# Patient Record
Sex: FEMALE | Race: WHITE | NOT HISPANIC OR LATINO
[De-identification: names, ages, dates, MRNs, and addresses within clinical notes are randomized per-mention and may not be internally consistent; named-entity substitution may affect disease eponyms.]

---

## 2021-03-01 ENCOUNTER — APPOINTMENT (OUTPATIENT)
Dept: ANTEPARTUM | Facility: CLINIC | Age: 26
End: 2021-03-01
Payer: COMMERCIAL

## 2021-03-01 ENCOUNTER — ASOB RESULT (OUTPATIENT)
Age: 26
End: 2021-03-01

## 2021-03-01 PROCEDURE — 76811 OB US DETAILED SNGL FETUS: CPT

## 2021-03-01 PROCEDURE — 99072 ADDL SUPL MATRL&STAF TM PHE: CPT

## 2021-06-18 ENCOUNTER — INPATIENT (INPATIENT)
Facility: HOSPITAL | Age: 26
LOS: 1 days | Discharge: HOME | End: 2021-06-20
Attending: STUDENT IN AN ORGANIZED HEALTH CARE EDUCATION/TRAINING PROGRAM | Admitting: STUDENT IN AN ORGANIZED HEALTH CARE EDUCATION/TRAINING PROGRAM
Payer: COMMERCIAL

## 2021-06-18 ENCOUNTER — OUTPATIENT (OUTPATIENT)
Dept: OUTPATIENT SERVICES | Facility: HOSPITAL | Age: 26
LOS: 1 days | Discharge: HOME | End: 2021-06-18

## 2021-06-18 VITALS — HEART RATE: 81 BPM | DIASTOLIC BLOOD PRESSURE: 61 MMHG | SYSTOLIC BLOOD PRESSURE: 109 MMHG

## 2021-06-18 VITALS — TEMPERATURE: 98 F

## 2021-06-18 VITALS
SYSTOLIC BLOOD PRESSURE: 109 MMHG | HEART RATE: 81 BPM | TEMPERATURE: 98 F | RESPIRATION RATE: 18 BRPM | DIASTOLIC BLOOD PRESSURE: 61 MMHG

## 2021-06-18 LAB
AMPHET UR-MCNC: NEGATIVE — SIGNIFICANT CHANGE UP
APPEARANCE UR: CLEAR — SIGNIFICANT CHANGE UP
BARBITURATES UR SCN-MCNC: NEGATIVE — SIGNIFICANT CHANGE UP
BASOPHILS # BLD AUTO: 0.03 K/UL — SIGNIFICANT CHANGE UP (ref 0–0.2)
BASOPHILS NFR BLD AUTO: 0.2 % — SIGNIFICANT CHANGE UP (ref 0–1)
BENZODIAZ UR-MCNC: NEGATIVE — SIGNIFICANT CHANGE UP
BILIRUB UR-MCNC: NEGATIVE — SIGNIFICANT CHANGE UP
BLD GP AB SCN SERPL QL: SIGNIFICANT CHANGE UP
BUPRENORPHINE SCREEN, URINE RESULT: NEGATIVE — SIGNIFICANT CHANGE UP
COCAINE METAB.OTHER UR-MCNC: NEGATIVE — SIGNIFICANT CHANGE UP
COLOR SPEC: SIGNIFICANT CHANGE UP
DIFF PNL FLD: NEGATIVE — SIGNIFICANT CHANGE UP
EOSINOPHIL # BLD AUTO: 0.03 K/UL — SIGNIFICANT CHANGE UP (ref 0–0.7)
EOSINOPHIL NFR BLD AUTO: 0.2 % — SIGNIFICANT CHANGE UP (ref 0–8)
FENTANYL UR QL: NEGATIVE — SIGNIFICANT CHANGE UP
GLUCOSE UR QL: NEGATIVE — SIGNIFICANT CHANGE UP
HCT VFR BLD CALC: 29.4 % — LOW (ref 37–47)
HGB BLD-MCNC: 10 G/DL — LOW (ref 12–16)
IMM GRANULOCYTES NFR BLD AUTO: 0.5 % — HIGH (ref 0.1–0.3)
KETONES UR-MCNC: ABNORMAL
L&D DRUG SCREEN, URINE: SIGNIFICANT CHANGE UP
LEUKOCYTE ESTERASE UR-ACNC: NEGATIVE — SIGNIFICANT CHANGE UP
LYMPHOCYTES # BLD AUTO: 18 % — LOW (ref 20.5–51.1)
LYMPHOCYTES # BLD AUTO: 2.38 K/UL — SIGNIFICANT CHANGE UP (ref 1.2–3.4)
MCHC RBC-ENTMCNC: 31.2 PG — HIGH (ref 27–31)
MCHC RBC-ENTMCNC: 34 G/DL — SIGNIFICANT CHANGE UP (ref 32–37)
MCV RBC AUTO: 91.6 FL — SIGNIFICANT CHANGE UP (ref 81–99)
METHADONE UR-MCNC: NEGATIVE — SIGNIFICANT CHANGE UP
MONOCYTES # BLD AUTO: 1.02 K/UL — HIGH (ref 0.1–0.6)
MONOCYTES NFR BLD AUTO: 7.7 % — SIGNIFICANT CHANGE UP (ref 1.7–9.3)
NEUTROPHILS # BLD AUTO: 9.69 K/UL — HIGH (ref 1.4–6.5)
NEUTROPHILS NFR BLD AUTO: 73.4 % — SIGNIFICANT CHANGE UP (ref 42.2–75.2)
NITRITE UR-MCNC: NEGATIVE — SIGNIFICANT CHANGE UP
NRBC # BLD: 0 /100 WBCS — SIGNIFICANT CHANGE UP (ref 0–0)
OPIATES UR-MCNC: NEGATIVE — SIGNIFICANT CHANGE UP
OXYCODONE UR-MCNC: NEGATIVE — SIGNIFICANT CHANGE UP
PCP UR-MCNC: NEGATIVE — SIGNIFICANT CHANGE UP
PH UR: 7 — SIGNIFICANT CHANGE UP (ref 5–8)
PLATELET # BLD AUTO: 251 K/UL — SIGNIFICANT CHANGE UP (ref 130–400)
PRENATAL SYPHILIS TEST: SIGNIFICANT CHANGE UP
PROPOXYPHENE QUALITATIVE URINE RESULT: NEGATIVE — SIGNIFICANT CHANGE UP
PROT UR-MCNC: NEGATIVE — SIGNIFICANT CHANGE UP
RBC # BLD: 3.21 M/UL — LOW (ref 4.2–5.4)
RBC # FLD: 12.3 % — SIGNIFICANT CHANGE UP (ref 11.5–14.5)
SARS-COV-2 RNA SPEC QL NAA+PROBE: SIGNIFICANT CHANGE UP
SP GR SPEC: 1.01 — SIGNIFICANT CHANGE UP (ref 1.01–1.03)
UROBILINOGEN FLD QL: SIGNIFICANT CHANGE UP
WBC # BLD: 13.22 K/UL — HIGH (ref 4.8–10.8)
WBC # FLD AUTO: 13.22 K/UL — HIGH (ref 4.8–10.8)

## 2021-06-18 PROCEDURE — 59400 OBSTETRICAL CARE: CPT

## 2021-06-18 RX ORDER — OXYTOCIN 10 UNIT/ML
333.33 VIAL (ML) INJECTION
Qty: 20 | Refills: 0 | Status: DISCONTINUED | OUTPATIENT
Start: 2021-06-18 | End: 2021-06-18

## 2021-06-18 RX ORDER — OXYTOCIN 10 UNIT/ML
333.33 VIAL (ML) INJECTION
Qty: 20 | Refills: 0 | Status: DISCONTINUED | OUTPATIENT
Start: 2021-06-18 | End: 2021-06-20

## 2021-06-18 RX ORDER — ONDANSETRON 8 MG/1
4 TABLET, FILM COATED ORAL EVERY 6 HOURS
Refills: 0 | Status: DISCONTINUED | OUTPATIENT
Start: 2021-06-18 | End: 2021-06-18

## 2021-06-18 RX ORDER — IBUPROFEN 200 MG
600 TABLET ORAL EVERY 6 HOURS
Refills: 0 | Status: DISCONTINUED | OUTPATIENT
Start: 2021-06-18 | End: 2021-06-20

## 2021-06-18 RX ORDER — SODIUM CHLORIDE 9 MG/ML
3 INJECTION INTRAMUSCULAR; INTRAVENOUS; SUBCUTANEOUS EVERY 8 HOURS
Refills: 0 | Status: DISCONTINUED | OUTPATIENT
Start: 2021-06-18 | End: 2021-06-20

## 2021-06-18 RX ORDER — MAGNESIUM HYDROXIDE 400 MG/1
30 TABLET, CHEWABLE ORAL
Refills: 0 | Status: DISCONTINUED | OUTPATIENT
Start: 2021-06-18 | End: 2021-06-20

## 2021-06-18 RX ORDER — OXYCODONE HYDROCHLORIDE 5 MG/1
5 TABLET ORAL
Refills: 0 | Status: DISCONTINUED | OUTPATIENT
Start: 2021-06-18 | End: 2021-06-20

## 2021-06-18 RX ORDER — HYDROCORTISONE 1 %
1 OINTMENT (GRAM) TOPICAL EVERY 6 HOURS
Refills: 0 | Status: DISCONTINUED | OUTPATIENT
Start: 2021-06-18 | End: 2021-06-20

## 2021-06-18 RX ORDER — DIPHENHYDRAMINE HCL 50 MG
25 CAPSULE ORAL ONCE
Refills: 0 | Status: COMPLETED | OUTPATIENT
Start: 2021-06-18 | End: 2021-06-18

## 2021-06-18 RX ORDER — DIBUCAINE 1 %
1 OINTMENT (GRAM) RECTAL EVERY 6 HOURS
Refills: 0 | Status: DISCONTINUED | OUTPATIENT
Start: 2021-06-18 | End: 2021-06-20

## 2021-06-18 RX ORDER — DIPHENHYDRAMINE HCL 50 MG
25 CAPSULE ORAL EVERY 6 HOURS
Refills: 0 | Status: DISCONTINUED | OUTPATIENT
Start: 2021-06-18 | End: 2021-06-20

## 2021-06-18 RX ORDER — BUTORPHANOL TARTRATE 2 MG/ML
2 INJECTION, SOLUTION INTRAMUSCULAR; INTRAVENOUS ONCE
Refills: 0 | Status: DISCONTINUED | OUTPATIENT
Start: 2021-06-18 | End: 2021-06-18

## 2021-06-18 RX ORDER — DEXAMETHASONE 0.5 MG/5ML
4 ELIXIR ORAL EVERY 6 HOURS
Refills: 0 | Status: DISCONTINUED | OUTPATIENT
Start: 2021-06-18 | End: 2021-06-18

## 2021-06-18 RX ORDER — CITRIC ACID/SODIUM CITRATE 300-500 MG
15 SOLUTION, ORAL ORAL EVERY 6 HOURS
Refills: 0 | Status: DISCONTINUED | OUTPATIENT
Start: 2021-06-18 | End: 2021-06-18

## 2021-06-18 RX ORDER — IBUPROFEN 200 MG
600 TABLET ORAL EVERY 6 HOURS
Refills: 0 | Status: COMPLETED | OUTPATIENT
Start: 2021-06-18 | End: 2022-05-17

## 2021-06-18 RX ORDER — PRAMOXINE HYDROCHLORIDE 150 MG/15G
1 AEROSOL, FOAM RECTAL EVERY 4 HOURS
Refills: 0 | Status: DISCONTINUED | OUTPATIENT
Start: 2021-06-18 | End: 2021-06-20

## 2021-06-18 RX ORDER — ACETAMINOPHEN 500 MG
975 TABLET ORAL
Refills: 0 | Status: DISCONTINUED | OUTPATIENT
Start: 2021-06-18 | End: 2021-06-20

## 2021-06-18 RX ORDER — TETANUS TOXOID, REDUCED DIPHTHERIA TOXOID AND ACELLULAR PERTUSSIS VACCINE, ADSORBED 5; 2.5; 8; 8; 2.5 [IU]/.5ML; [IU]/.5ML; UG/.5ML; UG/.5ML; UG/.5ML
0.5 SUSPENSION INTRAMUSCULAR ONCE
Refills: 0 | Status: DISCONTINUED | OUTPATIENT
Start: 2021-06-18 | End: 2021-06-20

## 2021-06-18 RX ORDER — AER TRAVELER 0.5 G/1
1 SOLUTION RECTAL; TOPICAL EVERY 4 HOURS
Refills: 0 | Status: DISCONTINUED | OUTPATIENT
Start: 2021-06-18 | End: 2021-06-20

## 2021-06-18 RX ORDER — LANOLIN
1 OINTMENT (GRAM) TOPICAL EVERY 6 HOURS
Refills: 0 | Status: DISCONTINUED | OUTPATIENT
Start: 2021-06-18 | End: 2021-06-20

## 2021-06-18 RX ORDER — KETOROLAC TROMETHAMINE 30 MG/ML
30 SYRINGE (ML) INJECTION ONCE
Refills: 0 | Status: DISCONTINUED | OUTPATIENT
Start: 2021-06-18 | End: 2021-06-18

## 2021-06-18 RX ORDER — OXYCODONE HYDROCHLORIDE 5 MG/1
5 TABLET ORAL ONCE
Refills: 0 | Status: DISCONTINUED | OUTPATIENT
Start: 2021-06-18 | End: 2021-06-20

## 2021-06-18 RX ORDER — BENZOCAINE 10 %
1 GEL (GRAM) MUCOUS MEMBRANE EVERY 6 HOURS
Refills: 0 | Status: DISCONTINUED | OUTPATIENT
Start: 2021-06-18 | End: 2021-06-20

## 2021-06-18 RX ORDER — SODIUM CHLORIDE 9 MG/ML
1000 INJECTION, SOLUTION INTRAVENOUS
Refills: 0 | Status: DISCONTINUED | OUTPATIENT
Start: 2021-06-18 | End: 2021-06-18

## 2021-06-18 RX ORDER — FENTANYL/BUPIVACAINE/NS/PF 2MCG/ML-.1
250 PLASTIC BAG, INJECTION (ML) INJECTION
Refills: 0 | Status: DISCONTINUED | OUTPATIENT
Start: 2021-06-18 | End: 2021-06-18

## 2021-06-18 RX ORDER — SIMETHICONE 80 MG/1
80 TABLET, CHEWABLE ORAL EVERY 4 HOURS
Refills: 0 | Status: DISCONTINUED | OUTPATIENT
Start: 2021-06-18 | End: 2021-06-20

## 2021-06-18 RX ORDER — NALOXONE HYDROCHLORIDE 4 MG/.1ML
0.1 SPRAY NASAL
Refills: 0 | Status: DISCONTINUED | OUTPATIENT
Start: 2021-06-18 | End: 2021-06-18

## 2021-06-18 RX ORDER — BUTORPHANOL TARTRATE 2 MG/ML
0.2 INJECTION, SOLUTION INTRAMUSCULAR; INTRAVENOUS ONCE
Refills: 0 | Status: DISCONTINUED | OUTPATIENT
Start: 2021-06-18 | End: 2021-06-18

## 2021-06-18 RX ADMIN — Medication 1 SPRAY(S): at 22:31

## 2021-06-18 RX ADMIN — Medication 975 MILLIGRAM(S): at 18:55

## 2021-06-18 RX ADMIN — Medication 975 MILLIGRAM(S): at 19:40

## 2021-06-18 RX ADMIN — BUTORPHANOL TARTRATE 2 MILLIGRAM(S): 2 INJECTION, SOLUTION INTRAMUSCULAR; INTRAVENOUS at 09:12

## 2021-06-18 RX ADMIN — Medication 25 MILLIGRAM(S): at 09:12

## 2021-06-18 RX ADMIN — Medication 1 APPLICATION(S): at 22:31

## 2021-06-18 RX ADMIN — SODIUM CHLORIDE 3 MILLILITER(S): 9 INJECTION INTRAMUSCULAR; INTRAVENOUS; SUBCUTANEOUS at 22:29

## 2021-06-18 RX ADMIN — Medication 600 MILLIGRAM(S): at 23:22

## 2021-06-18 RX ADMIN — Medication 30 MILLIGRAM(S): at 17:24

## 2021-06-18 RX ADMIN — Medication 30 MILLIGRAM(S): at 18:55

## 2021-06-18 NOTE — OB PROVIDER TRIAGE NOTE - NS_OBGYNHISTORY_OBGYN_ALL_OB_FT
#1: 11/15/2017 boy 6 lb 6 oz  h/o ectopic pregnancy treated with MTX    GYN Hx:  Denies h/o ovarian cysts, fibroids, STIs, or abnormal pap smears.

## 2021-06-18 NOTE — OB PROVIDER TRIAGE NOTE - NSOBPROVIDERNOTE_OBGYN_ALL_OB_FT
27yo  @37w3d, GBS unknown, in latent labor.   - Patient amenable to returning to L&D after walking for a few hours to be reexamined.   - labor precautions discussed     and  aware 25yo  @37w3d, GBS unknown, in latent labor for stadol, reassuring maternal and fetal status  -continuous EFM and toco  -vitals  -stadol  -will reassess at 1030     and Dr. Santacruz to be made aware

## 2021-06-18 NOTE — OB PROVIDER TRIAGE NOTE - NSHPPHYSICALEXAM_GEN_ALL_CORE
Vital Signs Last 24 Hrs  T(C): 36.7 (18 Jun 2021 03:30), Max: 36.7 (18 Jun 2021 02:53)  T(F): 98 (18 Jun 2021 03:30), Max: 98 (18 Jun 2021 02:53)  HR: 81 (18 Jun 2021 03:30) (81 - 81)  BP: 109/61 (18 Jun 2021 03:30) (109/61 - 109/61)  RR: 18 (18 Jun 2021 03:30) (18 - 18)    Gen: AOx3, NAD  Abd: soft, gravid, nontender, palpable contractions  Ext: no swelling    EFM:135/mod martin/+accels   [place]: q3-5min   SVE: 3/90/0/vtx/intact

## 2021-06-18 NOTE — OB PROVIDER TRIAGE NOTE - NSOBPROVIDERNOTE_OBGYN_ALL_OB_FT
25yo  @37w3d, GBS unknown, in latent labor.   - Patient amenable to returning to L&D after walking for a few hours to be reexamined.   - labor precautions discussed     and  aware

## 2021-06-18 NOTE — OB PROVIDER H&P - NSHPPHYSICALEXAM_GEN_ALL_CORE
PHYSICAL EXAM:  T(F): 97.2 (06-18 @ 08:23)  HR: 87 (06-18 @ 13:50)  BP: 108/58 (06-18 @ 13:47)  RR: 16 (06-18 @ 08:23)  Constitutional: AAOx3, NAD  Abdomen: Soft, gravid, nontender, + palpable ctx  FHR CAT I  [place] Q 4-  VE 4-5/90/-0 vertex

## 2021-06-18 NOTE — OB PROVIDER TRIAGE NOTE - NSHPLABSRESULTS_GEN_ALL_CORE
Labs  12/3/20  HepBsAg NR  RPR NR  Measles immune  varicella immune  rubella immune  mumps immune   B positive   antibody neg  HIV NR    4/8      6/16  HIV NR    No sonograms available at the moment

## 2021-06-18 NOTE — OB PROVIDER H&P - ASSESSMENT
Please contact Dr Leggett if any symptoms change, worsen or any new issues arise.     You may wash your hair gently as discussed;     Please review your use of Diazepam with Dr Gates at your next visit    Please register, if you haven't already, onto the Patient Portal to enhance communications with Dr. Leggett and his office.    Any lab and other testing results will be conveyed to you within a few days via this portal(or via phone if we agreed to do so).     Please contact us if you don't receive your results within one week from your visit.     We appreciate you completing and returning your Patient Satisfaction Survey to insure we are delivering you excellent services.      26y   @  37.3 weeks in active labor  Admit to Labor & delivery  IV hydration  admission labs  Clear fluids  EFM & TOCO monitoring  analgesia prn  Dr Rey aware

## 2021-06-18 NOTE — OB PROVIDER TRIAGE NOTE - NSHPPHYSICALEXAM_GEN_ALL_CORE
T(C): 36.2 (06-18-21 @ 08:23), Max: 36.9 (06-18-21 @ 08:13)  HR: 88 (06-18-21 @ 09:03) (81 - 88)  BP: 101/57 (06-18-21 @ 09:03) (101/57 - 109/61)  RR: 16 (06-18-21 @ 08:23) (16 - 18)  BMI (kg/m2): 21.2 (06-18-21 @ 02:53)    Gen: A+OX3. NAD  Abd: Soft, Nontender. Gravid.  FHR:  TOCO:  SVE:     EFW by Leopolds: T(C): 36.2 (06-18-21 @ 08:23), Max: 36.9 (06-18-21 @ 08:13)  HR: 88 (06-18-21 @ 09:03) (81 - 88)  BP: 101/57 (06-18-21 @ 09:03) (101/57 - 109/61)  RR: 16 (06-18-21 @ 08:23) (16 - 18)  BMI (kg/m2): 21.2 (06-18-21 @ 02:53)    Gen: A+OX3. NAD  Abd: Soft, Nontender. Gravid.  FHR: 135bpm/moderate variability/+accelerations/no decelerations  TOCO: q7mins  SVE: 3/90/-1    EFW by Leopolds: 3200

## 2021-06-18 NOTE — OB RN PATIENT PROFILE - NO PERTINENT FAMILY HISTORY IN FIRST DEGREE RELATIVES OF:
Medication:Alprazolam  PDMP   08/27/2020  1  08/27/2020  ALPRAZOLAM 0 5 MG TABLET  30 0  30  TI [facility]       Active agreement on file -No self

## 2021-06-18 NOTE — OB PROVIDER H&P - HISTORY OF PRESENT ILLNESS
27yo  @37w3d, presented to L&D for contractions that began @0430 yesterday, felt every 10 min, ranging from 3/10 to 6/10 intensity. Patient was seen earlier on L&D with contractions she states the pain is more intense now. Denies vaginal bleeding. Denies leakage of fluid. Reports good fetal movement. Denies complications during pregnancy. GBS unknown. No h/o GBS in previous pregnancy.   Pt is now s/p therapeutic rest with cervical changes and in labor

## 2021-06-18 NOTE — OB PROVIDER DELIVERY SUMMARY - NSPROVIDERDELIVERYNOTE_OBGYN_ALL_OB_FT
Patient found to be FD and pushing effectively to delivery of healthy infant. Infant delivered in MAILE position at 16:50, APGAR 9/9. Infant cried spontaneously and moved all four limbs. Placenta dleivered intact at 16:58, 3VC noted. Uterus massaged and firm. Cervix and perineum intact. Small vaginal 1st degree laceration noted and repaired in regular fashion. EBL 200cc

## 2021-06-18 NOTE — OB PROVIDER TRIAGE NOTE - HISTORY OF PRESENT ILLNESS
27yo  @37w3d, presented to L&D for contractions that began @0430 yesterday, felt every 10 min, ranging from 3/10 to 6/10 intensity. Denies vaginal bleeding. Denies leakage of fluid. Reports good fetal movement. Denies complications during pregnancy. GBS unknown. No h/o GBS in previous pregnancy.

## 2021-06-18 NOTE — OB PROVIDER TRIAGE NOTE - HISTORY OF PRESENT ILLNESS
25yo  @37w3d, presented to L&D for contractions that began @0430 yesterday, felt every 10 min, ranging from 3/10 to 6/10 intensity. Patient was seen earlier on L&D with contractions she states the pain is more intense now. Denies vaginal bleeding. Denies leakage of fluid. Reports good fetal movement. Denies complications during pregnancy. GBS unknown. No h/o GBS in previous pregnancy.

## 2021-06-19 ENCOUNTER — TRANSCRIPTION ENCOUNTER (OUTPATIENT)
Age: 26
End: 2021-06-19

## 2021-06-19 VITALS
HEART RATE: 72 BPM | TEMPERATURE: 97 F | RESPIRATION RATE: 18 BRPM | SYSTOLIC BLOOD PRESSURE: 106 MMHG | DIASTOLIC BLOOD PRESSURE: 54 MMHG

## 2021-06-19 LAB
BASOPHILS # BLD AUTO: 0.05 K/UL — SIGNIFICANT CHANGE UP (ref 0–0.2)
BASOPHILS NFR BLD AUTO: 0.4 % — SIGNIFICANT CHANGE UP (ref 0–1)
COVID-19 SPIKE DOMAIN AB INTERP: POSITIVE
COVID-19 SPIKE DOMAIN ANTIBODY RESULT: >250 U/ML — HIGH
EOSINOPHIL # BLD AUTO: 0.13 K/UL — SIGNIFICANT CHANGE UP (ref 0–0.7)
EOSINOPHIL NFR BLD AUTO: 1.1 % — SIGNIFICANT CHANGE UP (ref 0–8)
HCT VFR BLD CALC: 28.1 % — LOW (ref 37–47)
HGB BLD-MCNC: 9.8 G/DL — LOW (ref 12–16)
IMM GRANULOCYTES NFR BLD AUTO: 0.5 % — HIGH (ref 0.1–0.3)
LYMPHOCYTES # BLD AUTO: 1.93 K/UL — SIGNIFICANT CHANGE UP (ref 1.2–3.4)
LYMPHOCYTES # BLD AUTO: 15.8 % — LOW (ref 20.5–51.1)
MCHC RBC-ENTMCNC: 32.2 PG — HIGH (ref 27–31)
MCHC RBC-ENTMCNC: 34.9 G/DL — SIGNIFICANT CHANGE UP (ref 32–37)
MCV RBC AUTO: 92.4 FL — SIGNIFICANT CHANGE UP (ref 81–99)
MONOCYTES # BLD AUTO: 1.18 K/UL — HIGH (ref 0.1–0.6)
MONOCYTES NFR BLD AUTO: 9.7 % — HIGH (ref 1.7–9.3)
NEUTROPHILS # BLD AUTO: 8.87 K/UL — HIGH (ref 1.4–6.5)
NEUTROPHILS NFR BLD AUTO: 72.5 % — SIGNIFICANT CHANGE UP (ref 42.2–75.2)
NRBC # BLD: 0 /100 WBCS — SIGNIFICANT CHANGE UP (ref 0–0)
PLATELET # BLD AUTO: 210 K/UL — SIGNIFICANT CHANGE UP (ref 130–400)
RBC # BLD: 3.04 M/UL — LOW (ref 4.2–5.4)
RBC # FLD: 12.5 % — SIGNIFICANT CHANGE UP (ref 11.5–14.5)
SARS-COV-2 IGG+IGM SERPL QL IA: >250 U/ML — HIGH
SARS-COV-2 IGG+IGM SERPL QL IA: POSITIVE
WBC # BLD: 12.22 K/UL — HIGH (ref 4.8–10.8)
WBC # FLD AUTO: 12.22 K/UL — HIGH (ref 4.8–10.8)

## 2021-06-19 RX ORDER — ACETAMINOPHEN 500 MG
3 TABLET ORAL
Qty: 0 | Refills: 0 | DISCHARGE
Start: 2021-06-19

## 2021-06-19 RX ORDER — IBUPROFEN 200 MG
1 TABLET ORAL
Qty: 0 | Refills: 0 | DISCHARGE
Start: 2021-06-19

## 2021-06-19 RX ADMIN — Medication 975 MILLIGRAM(S): at 14:48

## 2021-06-19 RX ADMIN — Medication 975 MILLIGRAM(S): at 20:53

## 2021-06-19 RX ADMIN — Medication 975 MILLIGRAM(S): at 14:18

## 2021-06-19 RX ADMIN — Medication 975 MILLIGRAM(S): at 08:06

## 2021-06-19 RX ADMIN — Medication 600 MILLIGRAM(S): at 11:03

## 2021-06-19 RX ADMIN — Medication 600 MILLIGRAM(S): at 00:00

## 2021-06-19 RX ADMIN — SODIUM CHLORIDE 3 MILLILITER(S): 9 INJECTION INTRAMUSCULAR; INTRAVENOUS; SUBCUTANEOUS at 06:03

## 2021-06-19 RX ADMIN — Medication 600 MILLIGRAM(S): at 11:33

## 2021-06-19 RX ADMIN — Medication 600 MILLIGRAM(S): at 17:34

## 2021-06-19 RX ADMIN — Medication 600 MILLIGRAM(S): at 17:03

## 2021-06-19 RX ADMIN — Medication 600 MILLIGRAM(S): at 05:59

## 2021-06-19 RX ADMIN — Medication 975 MILLIGRAM(S): at 08:37

## 2021-06-19 NOTE — PROGRESS NOTE ADULT - SUBJECTIVE AND OBJECTIVE BOX
Patient evaluated at bedside, prolonged deceleration 2 minutes. Comfortable but feels pressure and pain from contractions.    EFM: 140bpm baseline, mod variability, accels present, deceleration 2.5 minutes wiht variabilyt maintained to 90bpm (recover with lateralization)  [place]: ctx q5-12 minutes  VE: 4-5/90/0    Plan  - admit for labor and cat 2 tracing  - CLD/IVF  - pain control prn  - efm/toco  - anticpate vaginal dleivery
Patient seen for progress of labor. Complains of pressure and contractions. Epidural in situ.    EFM: 140bpm baseline, mod variability, accels present, rare variable decels  [place]: ctx q4min  VE: 6/90/0    Plan  - efm/toco  - CLD/IVF  - epidural top off  - anticipate vaginal delivery
Patient seen resting comfortably. No acute events overnight, no current complaints. Pain controlled with motrin. She reports lochia as minimal, no fevers, chills, nausea, vomiting, SOB, palpitations, dizziness. Patient is ambulating, tolerating diet, voiding freely without issue. Breastfeeding without difficulty.     Exam:  Gen: AAOx3  Breast: no engorgement, erythema, or tenderness  Abd: soft, non-tender, non-distended, uterus firm and to umbilicus  Ext: non-tender LE bilaterally    A: s/p NSD PPD#1, stable    Plan  - continue routine pp care  - encourage ambulating and breastfeeding  - motrin for pain

## 2021-06-19 NOTE — DISCHARGE NOTE OB - CARE PROVIDER_API CALL
Yousuf Rey)  OBGYN [address] [place] [address]  Phone: [phone]  Fax: [phone]  Established Patient  Follow Up Time: Routine

## 2021-06-19 NOTE — DISCHARGE NOTE OB - PATIENT PORTAL LINK FT
You can access the FollowMyHealth Patient Portal offered by [facility] by registering at the following website: [email or web address]. By joining [facility]’s FollowMyHealth portal, you will also be able to view your health information using other applications (apps) compatible with our system.

## 2021-06-21 DIAGNOSIS — Z02.9 ENCOUNTER FOR ADMINISTRATIVE EXAMINATIONS, UNSPECIFIED: ICD-10-CM

## 2021-06-24 DIAGNOSIS — Z3A.37 37 WEEKS GESTATION OF PREGNANCY: ICD-10-CM

## 2021-06-24 DIAGNOSIS — Z34.83 ENCOUNTER FOR SUPERVISION OF OTHER NORMAL PREGNANCY, THIRD TRIMESTER: ICD-10-CM

## 2021-06-24 DIAGNOSIS — Z20.822 CONTACT WITH AND (SUSPECTED) EXPOSURE TO COVID-19: ICD-10-CM

## 2021-06-24 DIAGNOSIS — Z88.2 ALLERGY STATUS TO SULFONAMIDES: ICD-10-CM

## 2021-06-29 ENCOUNTER — TRANSCRIPTION ENCOUNTER (OUTPATIENT)
Age: 26
End: 2021-06-29

## 2021-08-03 NOTE — OB RN PATIENT PROFILE - AS HEIGHT TYPE
[FreeTextEntry1] : Continue medications\par Return in 1 month \par Follow up with cardiologist\par Recommend getting the COVID vaccine 
stated

## 2022-01-04 NOTE — PROCEDURE NOTE - NSANESTHTECH_OBGYN_ALL_OB
Last appt 8/30/21 and next appt 3/7/22. Last plavix refill 1/6/21 #90 RF x 3. Request routed to MD.  
L3-4

## 2022-05-05 ENCOUNTER — OUTPATIENT (OUTPATIENT)
Dept: OUTPATIENT SERVICES | Facility: HOSPITAL | Age: 27
LOS: 1 days | Discharge: HOME | End: 2022-05-05
Payer: COMMERCIAL

## 2022-05-05 DIAGNOSIS — Z00.8 ENCOUNTER FOR OTHER GENERAL EXAMINATION: ICD-10-CM

## 2022-05-05 PROBLEM — Z78.9 OTHER SPECIFIED HEALTH STATUS: Chronic | Status: ACTIVE | Noted: 2021-06-18

## 2022-05-05 PROCEDURE — 71046 X-RAY EXAM CHEST 2 VIEWS: CPT | Mod: 26

## 2023-02-21 ENCOUNTER — RESULT REVIEW (OUTPATIENT)
Age: 28
End: 2023-02-21

## 2023-03-21 ENCOUNTER — LABORATORY RESULT (OUTPATIENT)
Age: 28
End: 2023-03-21

## 2023-03-21 ENCOUNTER — APPOINTMENT (OUTPATIENT)
Dept: OBGYN | Facility: CLINIC | Age: 28
End: 2023-03-21
Payer: COMMERCIAL

## 2023-03-21 DIAGNOSIS — Z30.432 ENCOUNTER FOR REMOVAL OF INTRAUTERINE CONTRACEPTIVE DEVICE: ICD-10-CM

## 2023-03-21 DIAGNOSIS — Z01.419 ENCOUNTER FOR GYNECOLOGICAL EXAMINATION (GENERAL) (ROUTINE) W/OUT ABNORMAL FINDINGS: ICD-10-CM

## 2023-03-21 PROCEDURE — 81025 URINE PREGNANCY TEST: CPT

## 2023-03-21 PROCEDURE — 99395 PREV VISIT EST AGE 18-39: CPT | Mod: 25

## 2023-03-21 PROCEDURE — 81003 URINALYSIS AUTO W/O SCOPE: CPT | Mod: QW

## 2023-03-21 PROCEDURE — 58301 REMOVE INTRAUTERINE DEVICE: CPT | Mod: 52

## 2023-03-21 PROCEDURE — 76830 TRANSVAGINAL US NON-OB: CPT

## 2023-03-22 ENCOUNTER — RESULT CHARGE (OUTPATIENT)
Age: 28
End: 2023-03-22

## 2023-03-22 ENCOUNTER — APPOINTMENT (OUTPATIENT)
Dept: OBGYN | Facility: CLINIC | Age: 28
End: 2023-03-22

## 2023-03-22 LAB
BILIRUB UR QL STRIP: NORMAL
GLUCOSE UR-MCNC: NORMAL
HCG UR QL: 1 EU/DL
HCG UR QL: NEGATIVE
HGB UR QL STRIP.AUTO: NORMAL
KETONES UR-MCNC: NORMAL
LEUKOCYTE ESTERASE UR QL STRIP: NORMAL
NITRITE UR QL STRIP: NORMAL
PH UR STRIP: 6.5
PROT UR STRIP-MCNC: NORMAL
SP GR UR STRIP: 1.02

## 2023-03-22 NOTE — PLAN
[FreeTextEntry1] : Normal Exam , retained IUD \par -Pap done\par -Breast self exam reviewed \par -Gc Ct sent \par -Will try to remove in office again, if not able will schedule for OR removal \par -return visit PRN or 1 year\par

## 2023-03-22 NOTE — PHYSICAL EXAM
[Chaperone Present] : A chaperone was present in the examining room during all aspects of the physical examination [FreeTextEntry1] : Bp 112/73, weight 98  [Appropriately responsive] : appropriately responsive [Alert] : alert [No Lymphadenopathy] : no lymphadenopathy [No Acute Distress] : no acute distress [Soft] : soft [Non-tender] : non-tender [Non-distended] : non-distended [No HSM] : No HSM [No Lesions] : no lesions [No Mass] : no mass [Oriented x3] : oriented x3 [Examination Of The Breasts] : a normal appearance [No Masses] : no breast masses were palpable [Labia Majora] : normal [Labia Minora] : normal [Normal] : normal [Uterine Adnexae] : normal [FreeTextEntry5] : strings not seen

## 2023-03-22 NOTE — HISTORY OF PRESENT ILLNESS
[Patient reported PAP Smear was normal] : Patient reported PAP Smear was normal [FreeTextEntry1] : Pt is a , no real periods, here for annual exam and to remove Mirena IUD , no abnormal bleeding pain or d/c  [PapSmeardate] : 2019

## 2023-03-22 NOTE — PROCEDURE
[Liquid Base] : liquid base [Cervical Pap Smear] : cervical Pap smear [GC & Chlamydia via Pap] : GC & Chlamydia via Pap [No Complications] : there were no complications [Locate IUD] : locate IUD [Transvaginal Ultrasound] : transvaginal ultrasound [Anteverted] : anteverted [Not Visualized] : not visualized [IUD Removal] : intrauterine device (IUD) removal [Fertility Desired] : fertility desired [Risks] : risks [Benefits] : benefits [Alternatives] : alternatives [Speculum Placed] : speculum placed [Patient] : patient [Nonvisualization of Strings] : nonvisualization of strings [Tolerated Well] : Patient tolerated the procedure well [Heavy Vaginal Bleeding] : for heavy vaginal bleeding [Pelvic Pain] : for pelvic pain [___ Day(s)] : in [unfilled] day(s) [FreeTextEntry3] : Strings not seen Mirena  [FreeTextEntry5] : Mirena IUD seen in EE, normal positions  [FreeTextEntry4] : Retained Mirena IUD  [de-identified] : Unable tp retreive  [FreeTextEntry6] : Will try again in office to remove , if unable for OR

## 2023-03-28 ENCOUNTER — APPOINTMENT (OUTPATIENT)
Dept: OBGYN | Facility: CLINIC | Age: 28
End: 2023-03-28

## 2023-03-29 NOTE — ASU PATIENT PROFILE, ADULT - FALL HARM RISK - UNIVERSAL INTERVENTIONS
Bed in lowest position, wheels locked, appropriate side rails in place/Call bell, personal items and telephone in reach/Instruct patient to call for assistance before getting out of bed or chair/Non-slip footwear when patient is out of bed/[place] to call system/Physically safe environment - no spills, clutter or unnecessary equipment/Purposeful Proactive Rounding/Room/bathroom lighting operational, light cord in reach

## 2023-03-30 ENCOUNTER — TRANSCRIPTION ENCOUNTER (OUTPATIENT)
Age: 28
End: 2023-03-30

## 2023-03-30 ENCOUNTER — OUTPATIENT (OUTPATIENT)
Dept: INPATIENT UNIT | Facility: HOSPITAL | Age: 28
LOS: 1 days | Discharge: HOME | End: 2023-03-30
Payer: COMMERCIAL

## 2023-03-30 VITALS
RESPIRATION RATE: 19 BRPM | SYSTOLIC BLOOD PRESSURE: 108 MMHG | OXYGEN SATURATION: 100 % | DIASTOLIC BLOOD PRESSURE: 68 MMHG | HEART RATE: 80 BPM

## 2023-03-30 VITALS
HEART RATE: 77 BPM | TEMPERATURE: 98 F | DIASTOLIC BLOOD PRESSURE: 65 MMHG | WEIGHT: 97 LBS | RESPIRATION RATE: 18 BRPM | OXYGEN SATURATION: 99 % | HEIGHT: 61 IN | SYSTOLIC BLOOD PRESSURE: 108 MMHG

## 2023-03-30 DIAGNOSIS — T83.32XA DISPLACEMENT OF INTRAUTERINE CONTRACEPTIVE DEVICE, INITIAL ENCOUNTER: ICD-10-CM

## 2023-03-30 DIAGNOSIS — Z97.5 PRESENCE OF (INTRAUTERINE) CONTRACEPTIVE DEVICE: Chronic | ICD-10-CM
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
## 2023-03-30 LAB
BILIRUB UR QL STRIP: NORMAL
GLUCOSE UR-MCNC: NORMAL
HCG UR QL: 1 EU/DL
HGB UR QL STRIP.AUTO: NORMAL
KETONES UR-MCNC: NORMAL
LEUKOCYTE ESTERASE UR QL STRIP: NORMAL
NITRITE UR QL STRIP: NORMAL
PH UR STRIP: 7
PROT UR STRIP-MCNC: NORMAL
SP GR UR STRIP: 1.02

## 2023-03-30 PROCEDURE — 88300 SURGICAL PATH GROSS: CPT

## 2023-03-30 PROCEDURE — 58562 HYSTEROSCOPY REMOVE FB: CPT

## 2023-03-30 PROCEDURE — 88300 SURGICAL PATH GROSS: CPT | Mod: 26

## 2023-03-30 RX ORDER — HYDROMORPHONE HYDROCHLORIDE 2 MG/ML
0.5 INJECTION INTRAMUSCULAR; INTRAVENOUS; SUBCUTANEOUS
Refills: 0 | Status: DISCONTINUED | OUTPATIENT
Start: 2023-03-30 | End: 2023-03-30

## 2023-03-30 RX ORDER — ONDANSETRON 8 MG/1
4 TABLET, FILM COATED ORAL ONCE
Refills: 0 | Status: DISCONTINUED | OUTPATIENT
Start: 2023-03-30 | End: 2023-03-30

## 2023-03-30 NOTE — BRIEF OPERATIVE NOTE - NSICDXBRIEFPOSTOP_GEN_ALL_CORE_FT
POST-OP DIAGNOSIS:  Retained intrauterine contraceptive device (IUD) 30-Mar-2023 13:58:52  Brenda Hall

## 2023-03-30 NOTE — H&P PST ADULT - HISTORY OF PRESENT ILLNESS
29yo  here for hysteroscopic Mirena IUD removal. Removal was attempted in PMD office, however strings were not visualized. Currently denies fever, chills, CP, SOB, N/V, vaginal bleeding or urinary symptoms.    Obhx:  x2  Gynhx: Denies h/o fibroids, cysts, abnormal paps or STIs.

## 2023-03-30 NOTE — ASU DISCHARGE PLAN (ADULT/PEDIATRIC) - CARE PROVIDER_API CALL
Brett Cole)  Obstetrics and Gynecology  [address] [place] [address]  Phone: [phone]  Fax: [phone]  Follow Up Time: 2 weeks

## 2023-03-30 NOTE — BRIEF OPERATIVE NOTE - NSICDXBRIEFPROCEDURE_GEN_ALL_CORE_FT
PROCEDURES:  Hysteroscopy with removal of intrauterine device (IUD) with dilation and curettage of uterus 30-Mar-2023 13:58:35  Brenda Hall

## 2023-03-30 NOTE — ASU DISCHARGE PLAN (ADULT/PEDIATRIC) - PATIENT EDUCATION MATERIALS PROVIED
pain management/Pre-printed instructions given for nerve block/Pre-printed instructions given for other (specify)

## 2023-03-30 NOTE — ASU DISCHARGE PLAN (ADULT/PEDIATRIC) - NS MD DC FALL RISK RISK
For information on Fall & Injury Prevention, visit: [email or web address] OR  [email or web address] OR  https://www.cdc.gov/steadi/patient.html

## 2023-03-30 NOTE — BRIEF OPERATIVE NOTE - NSICDXBRIEFPREOP_GEN_ALL_CORE_FT
PRE-OP DIAGNOSIS:  Retained intrauterine contraceptive device (IUD) 30-Mar-2023 13:58:50  Brenda Hall

## 2023-03-30 NOTE — H&P PST ADULT - ATTENDING COMMENTS
Pt seen in preop,  she is a 29yo P2 presenting for hysteroscopic Mirena IUD removal, IUD is retained and was unable to remove in office.   -r/b/a discussed   -consent signed  -On call to OR

## 2023-03-30 NOTE — CHART NOTE - NSCHARTNOTEFT_GEN_A_CORE
PACU ANESTHESIA ADMISSION NOTE      Procedure: Hysteroscopy with removal of intrauterine device (IUD) with dilation and curettage of uterus      Post op diagnosis:  Retained intrauterine contraceptive device (IUD)        __x__  Patent Airway    __x__  Full return of protective reflexes    __x__  Full recovery from anesthesia / back to baseline status    Vitals:  T(C): 36.4 (03-30-23 @ 12:43), Max: 36.4 (03-30-23 @ 12:14)  HR: 77 (03-30-23 @ 12:43) (77 - 77)  BP: 108/65 (03-30-23 @ 12:43) (108/65 - 108/65)  RR: 18 (03-30-23 @ 12:43) (18 - 18)  SpO2: 99% (03-30-23 @ 12:43) (99% - 99%)    Mental Status:  __x__ Awake   ___x__ Alert   _____ Drowsy   _____ Sedated    Nausea/Vomiting:  __x__ NO  ______Yes,   See Post - Op Orders          Pain Scale (0-10):  _____    Treatment: ____ None    __x__ See Post - Op/PCA Orders    Post - Operative Fluids:   ____ Oral   __x__ See Post - Op Orders    Plan: Discharge:   __x__Home       _____Floor     _____Critical Care    _____  Other:_________________    Comments: Patient had smooth intraoperative event, no anesthesia complication.

## 2023-03-30 NOTE — BRIEF OPERATIVE NOTE - OPERATION/FINDINGS
Normal sized anteverted uterus, normal-appearing cervix, IUD strings not visualized. On hysteroscopy, IUD visualized in cervical canal, removed without difficulty. Normal uterine cavity and bilateral ostia.

## 2023-03-30 NOTE — PRE-ANESTHESIA EVALUATION ADULT - NSANTHADDINFOFT_GEN_ALL_CORE
risks and benefits thoroughly discussed including dental injury , hypoxia, respiratory and cardiac complications including death

## 2023-03-30 NOTE — ASU PREOP CHECKLIST - SPO2 (%)
I gave him what I will give him, needs an appt with Jt was told to already make this and I don't see he has. I will not refill his medications again as we discussed previously and Dr. Waters will be booked out so he has to make appt now for this.     анна   99

## 2023-04-03 LAB — SURGICAL PATHOLOGY STUDY: SIGNIFICANT CHANGE UP
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
## 2023-04-05 DIAGNOSIS — T83.32XA DISPLACEMENT OF INTRAUTERINE CONTRACEPTIVE DEVICE, INITIAL ENCOUNTER: ICD-10-CM

## 2023-04-05 DIAGNOSIS — Z88.2 ALLERGY STATUS TO SULFONAMIDES: ICD-10-CM

## 2023-04-27 ENCOUNTER — LABORATORY RESULT (OUTPATIENT)
Age: 28
End: 2023-04-27

## 2023-04-30 ENCOUNTER — LABORATORY RESULT (OUTPATIENT)
Age: 28
End: 2023-04-30

## 2023-05-03 ENCOUNTER — EMERGENCY (EMERGENCY)
Facility: HOSPITAL | Age: 28
LOS: 0 days | Discharge: HOME | End: 2023-05-03
Attending: EMERGENCY MEDICINE
Payer: COMMERCIAL

## 2023-05-03 VITALS
SYSTOLIC BLOOD PRESSURE: 131 MMHG | RESPIRATION RATE: 20 BRPM | HEIGHT: 61 IN | OXYGEN SATURATION: 99 % | DIASTOLIC BLOOD PRESSURE: 88 MMHG | HEART RATE: 111 BPM | WEIGHT: 95.02 LBS | TEMPERATURE: 98 F

## 2023-05-03 VITALS — HEART RATE: 77 BPM

## 2023-05-03 DIAGNOSIS — Z87.59 PERSONAL HISTORY OF OTHER COMPLICATIONS OF PREGNANCY, CHILDBIRTH AND THE PUERPERIUM: ICD-10-CM

## 2023-05-03 DIAGNOSIS — Z88.2 ALLERGY STATUS TO SULFONAMIDES: ICD-10-CM

## 2023-05-03 DIAGNOSIS — O20.0 THREATENED ABORTION: ICD-10-CM

## 2023-05-03 DIAGNOSIS — O20.9 HEMORRHAGE IN EARLY PREGNANCY, UNSPECIFIED: ICD-10-CM

## 2023-05-03 DIAGNOSIS — Z3A.01 LESS THAN 8 WEEKS GESTATION OF PREGNANCY: ICD-10-CM

## 2023-05-03 DIAGNOSIS — Z97.5 PRESENCE OF (INTRAUTERINE) CONTRACEPTIVE DEVICE: Chronic | ICD-10-CM

## 2023-05-03 DIAGNOSIS — Z91.018 ALLERGY TO OTHER FOODS: ICD-10-CM

## 2023-05-03 LAB
ALBUMIN SERPL ELPH-MCNC: 4.9 G/DL — SIGNIFICANT CHANGE UP (ref 3.5–5.2)
ALP SERPL-CCNC: 59 U/L — SIGNIFICANT CHANGE UP (ref 30–115)
ALT FLD-CCNC: 19 U/L — SIGNIFICANT CHANGE UP (ref 0–41)
ANION GAP SERPL CALC-SCNC: 15 MMOL/L — HIGH (ref 7–14)
APTT BLD: 36.2 SEC — SIGNIFICANT CHANGE UP (ref 27–39.2)
AST SERPL-CCNC: 20 U/L — SIGNIFICANT CHANGE UP (ref 0–41)
BASOPHILS # BLD AUTO: 0.05 K/UL — SIGNIFICANT CHANGE UP (ref 0–0.2)
BASOPHILS NFR BLD AUTO: 0.6 % — SIGNIFICANT CHANGE UP (ref 0–1)
BILIRUB DIRECT SERPL-MCNC: 0.3 MG/DL — SIGNIFICANT CHANGE UP (ref 0–0.3)
BILIRUB INDIRECT FLD-MCNC: 1.9 MG/DL — HIGH (ref 0.2–1.2)
BILIRUB SERPL-MCNC: 2.2 MG/DL — HIGH (ref 0.2–1.2)
BLD GP AB SCN SERPL QL: SIGNIFICANT CHANGE UP
BUN SERPL-MCNC: 9 MG/DL — LOW (ref 10–20)
CALCIUM SERPL-MCNC: 9.4 MG/DL — SIGNIFICANT CHANGE UP (ref 8.4–10.5)
CHLORIDE SERPL-SCNC: 105 MMOL/L — SIGNIFICANT CHANGE UP (ref 98–110)
CO2 SERPL-SCNC: 20 MMOL/L — SIGNIFICANT CHANGE UP (ref 17–32)
CREAT SERPL-MCNC: 0.6 MG/DL — LOW (ref 0.7–1.5)
EGFR: 127 ML/MIN/1.73M2 — SIGNIFICANT CHANGE UP
EOSINOPHIL # BLD AUTO: 0.05 K/UL — SIGNIFICANT CHANGE UP (ref 0–0.7)
EOSINOPHIL NFR BLD AUTO: 0.6 % — SIGNIFICANT CHANGE UP (ref 0–8)
GLUCOSE SERPL-MCNC: 100 MG/DL — HIGH (ref 70–99)
HCG SERPL QL: POSITIVE — SIGNIFICANT CHANGE UP
HCG SERPL-ACNC: 4601 MIU/ML — HIGH
HCT VFR BLD CALC: 40.6 % — SIGNIFICANT CHANGE UP (ref 37–47)
HGB BLD-MCNC: 14.1 G/DL — SIGNIFICANT CHANGE UP (ref 12–16)
IMM GRANULOCYTES NFR BLD AUTO: 0.4 % — HIGH (ref 0.1–0.3)
INR BLD: 1.03 RATIO — SIGNIFICANT CHANGE UP (ref 0.65–1.3)
LYMPHOCYTES # BLD AUTO: 1.97 K/UL — SIGNIFICANT CHANGE UP (ref 1.2–3.4)
LYMPHOCYTES # BLD AUTO: 22.1 % — SIGNIFICANT CHANGE UP (ref 20.5–51.1)
MCHC RBC-ENTMCNC: 31.3 PG — HIGH (ref 27–31)
MCHC RBC-ENTMCNC: 34.7 G/DL — SIGNIFICANT CHANGE UP (ref 32–37)
MCV RBC AUTO: 90 FL — SIGNIFICANT CHANGE UP (ref 81–99)
MONOCYTES # BLD AUTO: 0.55 K/UL — SIGNIFICANT CHANGE UP (ref 0.1–0.6)
MONOCYTES NFR BLD AUTO: 6.2 % — SIGNIFICANT CHANGE UP (ref 1.7–9.3)
NEUTROPHILS # BLD AUTO: 6.26 K/UL — SIGNIFICANT CHANGE UP (ref 1.4–6.5)
NEUTROPHILS NFR BLD AUTO: 70.1 % — SIGNIFICANT CHANGE UP (ref 42.2–75.2)
NRBC # BLD: 0 /100 WBCS — SIGNIFICANT CHANGE UP (ref 0–0)
PLATELET # BLD AUTO: 305 K/UL — SIGNIFICANT CHANGE UP (ref 130–400)
PMV BLD: 9.8 FL — SIGNIFICANT CHANGE UP (ref 7.4–10.4)
POTASSIUM SERPL-MCNC: 3.9 MMOL/L — SIGNIFICANT CHANGE UP (ref 3.5–5)
POTASSIUM SERPL-SCNC: 3.9 MMOL/L — SIGNIFICANT CHANGE UP (ref 3.5–5)
PROT SERPL-MCNC: 7.5 G/DL — SIGNIFICANT CHANGE UP (ref 6–8)
PROTHROM AB SERPL-ACNC: 11.8 SEC — SIGNIFICANT CHANGE UP (ref 9.95–12.87)
RBC # BLD: 4.51 M/UL — SIGNIFICANT CHANGE UP (ref 4.2–5.4)
RBC # FLD: 11.2 % — LOW (ref 11.5–14.5)
SODIUM SERPL-SCNC: 140 MMOL/L — SIGNIFICANT CHANGE UP (ref 135–146)
WBC # BLD: 8.92 K/UL — SIGNIFICANT CHANGE UP (ref 4.8–10.8)
WBC # FLD AUTO: 8.92 K/UL — SIGNIFICANT CHANGE UP (ref 4.8–10.8)

## 2023-05-03 PROCEDURE — 99284 EMERGENCY DEPT VISIT MOD MDM: CPT | Mod: 25

## 2023-05-03 PROCEDURE — 85610 PROTHROMBIN TIME: CPT

## 2023-05-03 PROCEDURE — 86850 RBC ANTIBODY SCREEN: CPT

## 2023-05-03 PROCEDURE — 85025 COMPLETE CBC W/AUTO DIFF WBC: CPT

## 2023-05-03 PROCEDURE — 85730 THROMBOPLASTIN TIME PARTIAL: CPT

## 2023-05-03 PROCEDURE — 76830 TRANSVAGINAL US NON-OB: CPT | Mod: 26

## 2023-05-03 PROCEDURE — 99284 EMERGENCY DEPT VISIT MOD MDM: CPT

## 2023-05-03 PROCEDURE — 84702 CHORIONIC GONADOTROPIN TEST: CPT

## 2023-05-03 PROCEDURE — 36415 COLL VENOUS BLD VENIPUNCTURE: CPT

## 2023-05-03 PROCEDURE — 80048 BASIC METABOLIC PNL TOTAL CA: CPT

## 2023-05-03 PROCEDURE — 84703 CHORIONIC GONADOTROPIN ASSAY: CPT

## 2023-05-03 PROCEDURE — 86901 BLOOD TYPING SEROLOGIC RH(D): CPT

## 2023-05-03 PROCEDURE — 99283 EMERGENCY DEPT VISIT LOW MDM: CPT | Mod: GC

## 2023-05-03 PROCEDURE — 86900 BLOOD TYPING SEROLOGIC ABO: CPT

## 2023-05-03 PROCEDURE — 76830 TRANSVAGINAL US NON-OB: CPT

## 2023-05-03 PROCEDURE — 80076 HEPATIC FUNCTION PANEL: CPT

## 2023-05-03 NOTE — ED PROVIDER NOTE - ATTENDING APP SHARED VISIT CONTRIBUTION OF CARE
I personally evaluated the patient. I reviewed the Resident’s or Physician Assistant’s note (as assigned above), and agree with the findings and plan except as documented in my note.     28 female presents to ED for evaluation of vaginal bleeding began acutely this am, unsuspected, without constitutional symptoms. Patient is newly pregnant by home test but no IUP, has been following [facility] as outpatient with PMD Dr. Cole.     PMH: prior left sided ectopic pregnancy with medical management in 2020. prior IUD placement / removal 3/2023, complicated due to entrapment and requiring surgical management    ROS otherwise unremarkable    PE: female in no distress. CV: pulses intact. CHEST: normal work of breathing. ABD: nondistended. mild LLQ tenderness to palpation noted. SKIN: normal. no pallor. EXT: FROM. NEURO: AAO 3 no focal deficits. HEENT: mucosa dry      Impression: vaginal bleeding potential ectopic pregnancy    Plan: IV labs imaging supportive care and reevaluation, OB consult

## 2023-05-03 NOTE — ED PROVIDER NOTE - CLINICAL SUMMARY MEDICAL DECISION MAKING FREE TEXT BOX
28 female presents to ED for vaginal bleeding today. Known to be pregnant unsure of dates, recent IUD removal and is actively trying to get pregnant. No constitutional symptoms. Had screening labs imaging supportive care medications and reevaluation, no acute emergent findings, symptoms improved, plan discussed with patient, will discharge with outpatient management and return and follow up instructions. Found to have subchorionic hematoma with IUP and no signs of heterotopic pregnancy. Risks include IUD use and prior left sided ectopic treated with medication. 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 Results communicated to patient and family with questions answered.

## 2023-05-03 NOTE — CONSULT NOTE ADULT - ATTENDING COMMENTS
early pregnant with vaginal bleeding and pain, ectopic ruled out, IUGS nad YS seen. beta with normal trend. no FP yet    plan  - f/u 11 days for viablity  - rh+  - precautions    time spent on e/m 20 min

## 2023-05-03 NOTE — ED PROVIDER NOTE - PATIENT PORTAL LINK FT
You can access the FollowMyHealth Patient Portal offered by [facility] by registering at the following website: [email or web address]. By joining [facility]’s FollowMyHealth portal, you will also be able to view your health information using other applications (apps) compatible with our system.

## 2023-05-03 NOTE — CONSULT NOTE ADULT - SUBJECTIVE AND OBJECTIVE BOX
PGY1 Note  Chief Complaint: vaginal bleeding    HPI: 27 yo , unknown LMP, presents to ED for abdominal cramping and large gush of blood at 8:30am. Pt reports following large gush, bleeding became minimal and is currently only staining. Abdominal pain also self resolved. Pt is currently pregnant, LMP unknown. Pt previously had Mirena IUD in situ and was amenorrheic. IUD removed hysteroscopically on 3/30/23. Pt reports following removal 4 days of light spotting. This is a planned and desired pregnancy. Pt has h/o left ectopic in 2020 treated with methotrexate.     Denies the following: constitutional symptoms, visual symptoms, cardiovascular symptoms, respiratory symptoms, GI symptoms, musculoskeletal symptoms, skin symptoms, neurologic symptoms, hematologic symptoms, allergic symptoms, psychiatric symptoms  Except any pertinent positives listed.     Ob/Gyn History:                   LMP -  unknown                  FT  x2, largest 6-10. Left ectopic treated with methotrexate  Denies history of ovarian cysts, uterine fibroids, abnormal paps, or STI    Home Medications:  Prenatal Multivitamins with Folic Acid 1 mg oral tablet: 1 tab(s) orally once a day (30 Mar 2023 12:22)    Allergies  sulfa drugs (Rash)  Kiwi (Rash)      PAST MEDICAL & SURGICAL HISTORY:  Ectopic pregnancy  Hysteroscopic IUD removal    FAMILY HISTORY:  No pertinent family history in first degree relatives    SOCIAL HISTORY: Denies cigarette use, alcohol use, or illicit drug use    Vital Signs Last 24 Hrs  T(F): 98.3 (03 May 2023 09:07), Max: 98.3 (03 May 2023 09:07)  HR: 111 (03 May 2023 09:07) (111 - 111)  BP: 131/88 (03 May 2023 09:07) (131/88 - 131/88)  RR: 20 (03 May 2023 09:07) (20 - 20)  Height (cm): 154.9 (23 @ 09:07)  Weight (kg): 43.1 (23 @ 09:07)  BMI (kg/m2): 18 (23 @ 09:07)  BSA (m2): 1.38 (23 @ 09:07)    General Appearance - AAOx3, NAD  Abdomen - Soft, nontender, nondistended, no rebound, no rigidity, no guarding, bowel sounds present    GYN/Pelvis:  External - normal female  Internal - 10cc red blood pooling in vagina, no active bleeding noted at cervix. Cervix closed, no CMT.  Uterus - 6 week sized, non-tender  Adnexa - non-palpable, non-tender    Meds:     Height (cm): 154.9 (23 @ 09:07)  Weight (kg): 43.1 (23 @ 09:07)  BMI (kg/m2): 18 (23 @ 09:07)  BSA (m2): 1.38 (23 @ 09:07)    LABS:                        14.1   8.92  )-----------( 305      ( 03 May 2023 10:10 )             40.6     HCG Quantitative, Serum: 4601.0 mIU/mL (23 @ 10:10)          140  |  105  |  9<L>  ----------------------------<  100<H>  3.9   |  20  |  0.6<L>    Ca    9.4      03 May 2023 10:10    TPro  7.5  /  Alb  4.9  /  TBili  2.2<H>  /  DBili  0.3  /  AST  20  /  ALT  19  /  AlkPhos  59      PT/INR - ( 03 May 2023 10:10 )   PT: 11.80 sec;   INR: 1.03 ratio         PTT - ( 03 May 2023 10:10 )  PTT:36.2 sec    RADIOLOGY & ADDITIONAL STUDIES:  from: US Transvaginal (23 @ 11:13)  NTERPRETATION:  CLINICAL INFORMATION: Removal of IUD 3/31, abnormal   menses    COMPARISON: None available.    TECHNIQUE:  Transabdominal and transvaginal sonography pelvis is performed    FINDINGS:  Uterus: 7.9 x 4.5 x 4.8 cm. There is a gestational sac measuring 0.6 cm.   Adjacent to the gestational sac is a hypoechoic area measuring 2.1 x 1.3   x 1.7 cm, possibly representing a subchorionic hematoma. A yolk sac is   identified. The uterus is heterogeneous    Right ovary: 3.3 x 1.9 x 2.5 cm. There is a complex corpus luteal cyst   measuring 1.9 cm. Vascular flow is identified to the right ovary.  Left ovary: 2.6 x 1.0 x 1.5 cm. Within normal limits.    Fluid: Trace    IMPRESSION:    Gestational sac measuring 0.6 cm with yolk sac present, consistent with   early pregnancy    Adjacent to the gestational sac is a hypoechoic area measuring 2.1 x 1.3   x 1.7 cm, possibly representing a subchorionic hematoma.    Complex right ovarian cyst, likely corpus luteal cyst measuring 1.9 cm.

## 2023-05-03 NOTE — ED ADULT NURSE NOTE - PRO INTERPRETER NEED 2
English Azithromycin Pregnancy And Lactation Text: This medication is considered safe during pregnancy and is also secreted in breast milk.

## 2023-05-03 NOTE — ED ADULT NURSE NOTE - OBJECTIVE STATEMENT
Patient is a 28 year old female complaining of left lower quadrant pain associated with bright red vaginal bleeding x30 minutes- patient had IUD taken out on March 30 and has not had her period since.

## 2023-05-03 NOTE — ED PROVIDER NOTE - NS ED ATTENDING STATEMENT MOD
This was a shared visit with the SHERIDAN. I reviewed and verified the documentation and independently performed the documented:

## 2023-05-03 NOTE — ED PROVIDER NOTE - CARE PROVIDER_API CALL
Brett Cole)  Obstetrics and Gynecology  [address] [place] [address]  Phone: [phone]  Fax: [phone]  Follow Up Time:

## 2023-05-03 NOTE — ED PROVIDER NOTE - OBJECTIVE STATEMENT
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 Pt reports she is recently pregnant, and due to history, went for BHCG 5 days prior and repeat yesterday which was 2000. 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 Pt reports she is recently pregnant, unknown LMP, and due to history, went for BHCG 5 days prior and repeat yesterday which was 2000. She denies dizziness, lightheadedness, persistent bleeding.

## 2023-05-03 NOTE — ED ADULT NURSE NOTE - NSIMPLEMENTINTERV_GEN_ALL_ED
Implemented All Universal Safety Interventions:  [place] to call system. Call bell, personal items and telephone within reach. Instruct patient to call for assistance. Room bathroom lighting operational. Non-slip footwear when patient is off stretcher. Physically safe environment: no spills, clutter or unnecessary equipment. Stretcher in lowest position, wheels locked, appropriate side rails in place.

## 2023-05-03 NOTE — CONSULT NOTE ADULT - ASSESSMENT
A/P: 29 yo , Rh Pos, h/o ectopic pregnancy, with early IUP on sonogram and vaginal bleeding now resolved, threatened , clinically and hemodynamically stable  -no acute GYN intervention required  -bleed precautions discussed  -f/u with PMD outpatient. Pt planning to see PMD this evening  -dispo per ED    INCOMPLETE  Dr. Santacruz aware, Dr. Rey to be aware A/P: 29 yo , Rh Pos, h/o ectopic pregnancy, with early IUP on sonogram and vaginal bleeding now resolved, threatened , clinically and hemodynamically stable  -no acute GYN intervention required  -bleed precautions discussed  -f/u with PMD outpatient. Pt planning to see PMD this evening  -dispo per ED      Dr. Santacruz aware, Dr. Rey to be aware

## 2023-05-03 NOTE — ED PROVIDER NOTE - NSFOLLOWUPINSTRUCTIONS_ED_ALL_ED_FT
Threatened Miscarriage  A threatened miscarriage is when a woman bleeds in her vagina during the first 20 weeks of pregnancy but the pregnancy has not ended. The doctor will do tests to make sure you are still pregnant. This condition does not mean your pregnancy will end, but it does increase the risk that it will end (miscarriage).    What are the causes?  Normally, the cause of this condition is not known.    What increases the risk?  These things may make a pregnant woman more likely to lose a pregnancy:    Certain health problems    Conditions that affect hormones, such as thyroid disease or polycystic ovary syndrome.  Diabetes.  Disorders that cause the body's disease-fighting system to attack itself by mistake.  Infections.  Bleeding problems.  Being very overweight.  Lifestyle factors    Using products that have tobacco or nicotine in them.  Being around tobacco smoke.  Having a lot of caffeine.  Using drugs.  Problems with reproductive organs or parts    Having a cervix that opens and thins before you are ready to give birth. The cervix is the lowest part of the womb.  Having Asherman syndrome, which leads to:  Scars in the womb.  The womb being an abnormal shape.  Growths (fibroids) in the womb.  Problems in the body that are present at birth.  Infection of the cervix or womb.  Personal or health history    Injury.  Having lost an unborn baby before.  Being younger than age 18 or older than age 35.  Being around a harmful substance, such as radiation.  Having lead or other heavy metals in:  Things you eat or drink.  The air around you.  Using certain medicines.  What are the signs or symptoms?  Bleeding from the vagina. You may also have cramps or pain.  Mild pain or cramps in your belly.  How is this diagnosed?    The doctor will do tests, such as an ultrasound to make sure you are still pregnant.  How is this treated?  There are no treatments that prevent loss of pregnancy. But you need to do the right things to take care of yourself at home.    Follow these instructions at home:  Get a lot of rest.  Do not have sex or douche if there is bleeding in the vagina.  Do not put things such as tampons in the vagina if it is bleeding.  Do not smoke or use drugs.  Do not drink alcohol.  Avoid caffeine.  Keep all follow-up visits while you are pregnant.  Contact a doctor if:  You are pregnant and you have one of these:  Light bleeding coming from your vagina.  Spots of blood coming from your vagina.  You have belly pain or cramping.  You have a fever.  Get help right away if:  Blood soaks through 2 large pads an hour for more than 2 hours.  Clots of blood come from your vagina.  Tissue comes out of your vagina.  Fluid leaks or gushes from your vagina.  You have very bad pain in your low back.  You have very bad cramps in your belly.  You have a fever, chills, and very bad belly pain.  Summary  A threatened miscarriage is when a woman bleeds in her vagina during the first 20 weeks of pregnancy but the pregnancy has not ended.  Normally, the cause of this condition is not known.  Symptoms include bleeding in the vagina or mild pain or cramps in your belly.  There are no treatments that prevent loss of pregnancy.  Keep all follow-up visits while you are pregnant.  This information is not intended to replace advice given to you by your health care provider. Make sure you discuss any questions you have with your health care provider.

## 2023-05-18 ENCOUNTER — APPOINTMENT (OUTPATIENT)
Dept: OBGYN | Facility: CLINIC | Age: 28
End: 2023-05-18
Payer: COMMERCIAL

## 2023-05-18 VITALS
WEIGHT: 98 LBS | SYSTOLIC BLOOD PRESSURE: 108 MMHG | DIASTOLIC BLOOD PRESSURE: 71 MMHG | HEIGHT: 62 IN | BODY MASS INDEX: 18.03 KG/M2

## 2023-05-18 LAB
BILIRUB UR QL STRIP: NORMAL
GLUCOSE UR-MCNC: NORMAL
HCG UR QL: 0.2 EU/DL
HCG UR QL: POSITIVE
HGB UR QL STRIP.AUTO: NORMAL
KETONES UR-MCNC: NORMAL
LEUKOCYTE ESTERASE UR QL STRIP: NORMAL
NITRITE UR QL STRIP: NORMAL
PH UR STRIP: 7
PROT UR STRIP-MCNC: 30
SP GR UR STRIP: 1.02

## 2023-05-18 PROCEDURE — 76817 TRANSVAGINAL US OBSTETRIC: CPT

## 2023-05-18 PROCEDURE — 81003 URINALYSIS AUTO W/O SCOPE: CPT | Mod: QW

## 2023-05-18 PROCEDURE — 99213 OFFICE O/P EST LOW 20 MIN: CPT | Mod: 25

## 2023-05-18 PROCEDURE — 81025 URINE PREGNANCY TEST: CPT

## 2023-05-18 NOTE — PLAN
[FreeTextEntry1] : 29 y/o p2012 with threated ab\par stable\par Rh +\par precautions\par will return for NIPS\par discussed aneuploidy testing, with T1 testing, wants that done\par precautions reviewed\par f/u 4 weeks\par time 20 min

## 2023-05-18 NOTE — PROCEDURE
[Transvaginal OB Sonogram] : Transvaginal OB Sonogram [Intrauterine Pregnancy] : intrauterine pregnancy [Yolk Sac] : yolk sac present [Fetal Heart] : fetal heart present [Transvaginal OB Sonogram WNL] : Transvaginal OB Sonogram WNL [FreeTextEntry1] : single viable iup crl 7.4 wks, no ff, no masses, whit 12/31/23

## 2023-05-18 NOTE — PHYSICAL EXAM
[Chaperone Present] : A chaperone was present in the examining room during all aspects of the physical examination [Labia Majora] : normal [Labia Minora] : normal [Normal] : normal [Uterine Adnexae] : normal [FreeTextEntry1] : Elzbieta

## 2023-05-18 NOTE — HISTORY OF PRESENT ILLNESS
[FreeTextEntry1] : 29 y/o p2012 LMP unsure here for f/u on abnormal bleeding and + ucg.  no pain or discharge.  Seen in ER after episode of bleeding of at least menstrual amount and, given h/o ectopic.  was told has large hematoma.\par \par nsd x 2, largest 6-10 lbs, ectopic treated with mtx\par \par no med or surgical hx\par \par rh +

## 2023-05-21 LAB — BACTERIA UR CULT: NORMAL

## 2023-05-31 ENCOUNTER — APPOINTMENT (OUTPATIENT)
Dept: OBGYN | Facility: CLINIC | Age: 28
End: 2023-05-31
Payer: COMMERCIAL

## 2023-05-31 ENCOUNTER — NON-APPOINTMENT (OUTPATIENT)
Age: 28
End: 2023-05-31

## 2023-05-31 VITALS — WEIGHT: 95 LBS | DIASTOLIC BLOOD PRESSURE: 66 MMHG | SYSTOLIC BLOOD PRESSURE: 101 MMHG | BODY MASS INDEX: 17.38 KG/M2

## 2023-05-31 DIAGNOSIS — O20.0 THREATENED ABORTION: ICD-10-CM

## 2023-05-31 LAB
BILIRUB UR QL STRIP: NORMAL
CYTOLOGY CVX/VAG DOC THIN PREP: NORMAL
GLUCOSE UR-MCNC: NORMAL
HCG UR QL: 0.2 EU/DL
HGB UR QL STRIP.AUTO: NORMAL
KETONES UR-MCNC: NORMAL
LEUKOCYTE ESTERASE UR QL STRIP: NORMAL
NITRITE UR QL STRIP: NORMAL
PH UR STRIP: 6.5
PROT UR STRIP-MCNC: NORMAL
SP GR UR STRIP: 1.02

## 2023-05-31 PROCEDURE — 76817 TRANSVAGINAL US OBSTETRIC: CPT

## 2023-05-31 PROCEDURE — 0501F PRENATAL FLOW SHEET: CPT

## 2023-05-31 PROCEDURE — 99213 OFFICE O/P EST LOW 20 MIN: CPT | Mod: 25

## 2023-05-31 RX ORDER — ONDANSETRON 4 MG/1
4 TABLET ORAL
Qty: 28 | Refills: 2 | Status: ACTIVE | COMMUNITY
Start: 2023-05-31 | End: 1900-01-01

## 2023-06-05 ENCOUNTER — APPOINTMENT (OUTPATIENT)
Dept: OBGYN | Facility: CLINIC | Age: 28
End: 2023-06-05
Payer: COMMERCIAL

## 2023-06-05 DIAGNOSIS — Z00.00 ENCOUNTER FOR GENERAL ADULT MEDICAL EXAMINATION W/OUT ABNORMAL FINDINGS: ICD-10-CM

## 2023-06-05 PROCEDURE — 36415 COLL VENOUS BLD VENIPUNCTURE: CPT

## 2023-06-07 ENCOUNTER — NON-APPOINTMENT (OUTPATIENT)
Age: 28
End: 2023-06-07

## 2023-06-07 PROBLEM — Z00.00 ENCOUNTER FOR PREVENTIVE HEALTH EXAMINATION: Status: ACTIVE | Noted: 2019-06-18

## 2023-06-12 ENCOUNTER — NON-APPOINTMENT (OUTPATIENT)
Age: 28
End: 2023-06-12

## 2023-06-13 ENCOUNTER — OUTPATIENT (OUTPATIENT)
Dept: OUTPATIENT SERVICES | Facility: HOSPITAL | Age: 28
LOS: 1 days | End: 2023-06-13
Payer: COMMERCIAL

## 2023-06-13 ENCOUNTER — APPOINTMENT (OUTPATIENT)
Dept: ANTEPARTUM | Facility: CLINIC | Age: 28
End: 2023-06-13
Payer: COMMERCIAL

## 2023-06-13 ENCOUNTER — ASOB RESULT (OUTPATIENT)
Age: 28
End: 2023-06-13

## 2023-06-13 DIAGNOSIS — Z97.5 PRESENCE OF (INTRAUTERINE) CONTRACEPTIVE DEVICE: Chronic | ICD-10-CM

## 2023-06-13 DIAGNOSIS — Z34.90 ENCOUNTER FOR SUPERVISION OF NORMAL PREGNANCY, UNSPECIFIED, UNSPECIFIED TRIMESTER: ICD-10-CM

## 2023-06-13 PROCEDURE — 76813 OB US NUCHAL MEAS 1 GEST: CPT | Mod: 26

## 2023-06-13 PROCEDURE — 76813 OB US NUCHAL MEAS 1 GEST: CPT

## 2023-06-14 DIAGNOSIS — Z36.89 ENCOUNTER FOR OTHER SPECIFIED ANTENATAL SCREENING: ICD-10-CM

## 2023-06-14 DIAGNOSIS — Z3A.11 11 WEEKS GESTATION OF PREGNANCY: ICD-10-CM

## 2023-06-14 DIAGNOSIS — Z36.82 ENCOUNTER FOR ANTENATAL SCREENING FOR NUCHAL TRANSLUCENCY: ICD-10-CM

## 2023-06-20 ENCOUNTER — APPOINTMENT (OUTPATIENT)
Dept: OBGYN | Facility: CLINIC | Age: 28
End: 2023-06-20

## 2023-06-20 ENCOUNTER — APPOINTMENT (OUTPATIENT)
Dept: OBGYN | Facility: CLINIC | Age: 28
End: 2023-06-20
Payer: COMMERCIAL

## 2023-06-20 VITALS — BODY MASS INDEX: 17.56 KG/M2 | DIASTOLIC BLOOD PRESSURE: 65 MMHG | SYSTOLIC BLOOD PRESSURE: 97 MMHG | WEIGHT: 96 LBS

## 2023-06-20 DIAGNOSIS — Z34.90 ENCOUNTER FOR SUPERVISION OF NORMAL PREGNANCY, UNSPECIFIED, UNSPECIFIED TRIMESTER: ICD-10-CM

## 2023-06-20 LAB
ABO + RH PNL BLD: NORMAL
B19V IGG SER QL IA: 0.56 INDEX
B19V IGG+IGM SER-IMP: NEGATIVE
B19V IGG+IGM SER-IMP: NORMAL
B19V IGM FLD-ACNC: 0.09 INDEX
B19V IGM SER-ACNC: NEGATIVE
BILIRUB UR QL STRIP: NORMAL
BLD GP AB SCN SERPL QL: NORMAL
GLUCOSE UR-MCNC: 100
HBV SURFACE AG SER QL: NONREACTIVE
HCG UR QL: 0.2 EU/DL
HCV AB SER QL: NONREACTIVE
HCV S/CO RATIO: 0.46 S/CO
HGB UR QL STRIP.AUTO: NORMAL
HIV1+2 AB SPEC QL IA.RAPID: NONREACTIVE
KETONES UR-MCNC: NORMAL
LEAD BLD-MCNC: <1 UG/DL
LEUKOCYTE ESTERASE UR QL STRIP: NORMAL
MEV IGG FLD QL IA: >300 AU/ML
MEV IGG+IGM SER-IMP: POSITIVE
MUV AB SER-ACNC: POSITIVE
MUV IGG SER QL IA: >300 AU/ML
NITRITE UR QL STRIP: NORMAL
PH UR STRIP: 6
PROT UR STRIP-MCNC: NORMAL
RUBV IGG FLD-ACNC: 4.4 INDEX
RUBV IGG SER-IMP: POSITIVE
SP GR UR STRIP: 1.02
T PALLIDUM AB SER QL IA: NEGATIVE
VZV AB TITR SER: POSITIVE
VZV IGG SER IF-ACNC: 253.3 INDEX

## 2023-06-20 PROCEDURE — 99213 OFFICE O/P EST LOW 20 MIN: CPT | Mod: 25

## 2023-06-20 PROCEDURE — 0502F SUBSEQUENT PRENATAL CARE: CPT

## 2024-02-28 NOTE — OB RN TRIAGE NOTE - PT NEEDS ASSIST
no
Urine Pregnancy Test Result: negative
Detail Level: None
Performed By: Yesenia Soni MA
Expiration Date (Optional): 04-
Lot # (Optional): [id number]

## 2024-12-13 NOTE — ASU DISCHARGE PLAN (ADULT/PEDIATRIC) - DISCHARGE PLAN IS COMPLETE AND GIVEN TO PATIENT
Chief Complaint   Patient presents with    Other     Mom present     URI     2 weeks of cough- not taking anything otc- still on antibiotic for ear infection        HISTORY OF PRESENT ILLNESS:    Teena is a 20 year old White  female here today accompanied by mom with reports of a persistent cough with purulent mucous, chest tightness, ongoing headaches, purulent nasal drainage, intermittent fever up to 100 F, diarrhea for 2 weeks. Has chronic nausea. She has asthma that is controlled with an albuterol inhaler; reports using this a \"couple times,\"  non-smoker. Was recently evaluated in the urgent care on 12/5 and started on amoxicillin and a prednisone burst. She is still taking the antibiotic. Has tried cough drops. She was subsequently seen by her ENT provider for eustachian tube dysfunction; follow-up in 3 months. Recent septoplasty surgery 11/22/24 with normal post-operative recovery. Mother reports that everyone in the household has been sick with recent Covid and bronchitis. Patient does work in a nursing home.     I have reviewed the patient's medications and allergies, past medical, surgical, social and family history, updating these as appropriate.  See Histories section of the EMR for a display of this information.    Current Outpatient Medications   Medication Sig Dispense Refill    amoxicillin-clavulanate (AUGMENTIN) 875-125 MG per tablet Take 1 tablet by mouth in the morning and 1 tablet in the evening. Do all this for 7 days. 14 tablet 0    fluticasone (FLONASE) 50 MCG/ACT nasal spray Spray 2 sprays in each nostril daily. 16 g 0    benzonatate (TESSALON PERLES) 100 MG capsule Take 1 capsule by mouth 3 times daily as needed for Cough. 30 capsule 0    venlafaxine XR (EFFEXOR XR) 37.5 MG 24 hr capsule Take 1 capsule by mouth daily. 30 capsule 2    cetirizine (ZyrTEC) 10 MG tablet Take 1 tablet by mouth daily. 30 tablet 0    ondansetron (ZOFRAN ODT) 4 MG disintegrating tablet Place 1 tablet onto the  tongue 4 times daily as needed for Nausea. 12 tablet 0    methylpheniDATE ER (CONCERTA) 54 MG CR tablet Take 1 tablet by mouth every morning. Dx F90.2 30 tablet 0    albuterol 108 (90 Base) MCG/ACT inhaler Inhale 2 puffs into the lungs every 4 hours as needed for Shortness of Breath or Wheezing.      rizatriptan (MAXALT-MLT) 5 MG disintegrating tablet Take 1 tablet by mouth as needed for Migraine. Dissolve 1 tablet on the tongue at onset of migraine. May repeat after 2 hours if needed. 12 tablet 1    aspirin-acetaminophen-caffeine (EXCEDRIN MIGRAINE) 250-250-65 MG per tablet Take 2 tablets by mouth every 4 hours as needed for Pain.       Current Facility-Administered Medications   Medication    levonorgestrel (MIRENA) (52 MG) 20 MCG/DAY intrauterine device 52 mg       Review of Systems   Constitutional:  Positive for chills, fever and malaise/fatigue.   HENT:  Positive for congestion and sinus pain. Negative for ear discharge, ear pain, nosebleeds and sore throat.    Respiratory:  Positive for cough, sputum production, shortness of breath and wheezing.         Chest tightness   Gastrointestinal:  Positive for diarrhea and nausea. Negative for abdominal pain and vomiting.   Genitourinary: Negative.    Musculoskeletal:  Positive for myalgias.   Neurological:  Positive for headaches. Negative for weakness.        PHYSICAL EXAMINATION:  Visit Vitals  BP 94/66   Pulse 89   Temp 98.9 °F (37.2 °C) (Tympanic)   Wt 81.6 kg (180 lb)   SpO2 99%         Physical Exam  Constitutional:       General: She is not in acute distress.     Appearance: She is normal weight. She is ill-appearing. She is not toxic-appearing or diaphoretic.   HENT:      Head: Normocephalic and atraumatic.      Right Ear: Ear canal and external ear normal. There is no impacted cerumen.      Left Ear: Ear canal and external ear normal. There is no impacted cerumen.      Ears:      Comments: Bilateral Tms intact, pearly grey with effusion     Nose: Congestion  and rhinorrhea present.      Comments: Posterior oral pharynx with cobblestone appearance and purulent mucous     Mouth/Throat:      Mouth: Mucous membranes are moist.      Pharynx: Oropharyngeal exudate present.   Eyes:      Conjunctiva/sclera: Conjunctivae normal.      Pupils: Pupils are equal, round, and reactive to light.   Cardiovascular:      Rate and Rhythm: Normal rate and regular rhythm.      Pulses: Normal pulses.      Heart sounds: Normal heart sounds. No murmur heard.     No friction rub. No gallop.   Pulmonary:      Effort: Pulmonary effort is normal. No respiratory distress.      Breath sounds: Normal breath sounds. No wheezing or rales.   Chest:      Chest wall: No tenderness.   Abdominal:      General: Abdomen is flat. Bowel sounds are normal. There is no distension.      Palpations: Abdomen is soft. There is no mass.      Tenderness: There is abdominal tenderness. There is no right CVA tenderness, left CVA tenderness, guarding or rebound.      Comments: Diffuse abdominal tenderness with palpation   Musculoskeletal:         General: No swelling.      Cervical back: Normal range of motion. Tenderness present. No rigidity.   Lymphadenopathy:      Cervical: Cervical adenopathy present.   Skin:     General: Skin is warm and dry.      Capillary Refill: Capillary refill takes less than 2 seconds.      Coloration: Skin is not jaundiced.   Neurological:      General: No focal deficit present.      Mental Status: She is alert and oriented to person, place, and time.      Gait: Gait normal.   Psychiatric:         Mood and Affect: Mood normal.         Behavior: Behavior normal.                ASSESSMENT & PLAN:    1. Acute non-recurrent pansinusitis    2. Mild intermittent asthma, unspecified whether complicated (CMD)    3. Nasal sinus congestion    4. Acute cough    5. Acute effusion of both middle ears      Will change antibiotics to Augmentin due to no improvement in symptoms and recent septoplasty surgery  in November. Flonase nasal spray for congestion, tessalon Perles for cough; albuterol inhaler as needed for cough and/or wheezing. She was encouraged to use over the counter Tylenol and ibuprofen for fever and body aches. Encouraged hydration and rest. Will test for Covid, Flu, and RSV; office will contact the patient will results. Work and school excuse provided. Advised that symptoms should be improving over the next 4-5 days and that she should follow-up with any ongoing or worsening symptoms. Patient and mother verbalized understanding and all questions were answered to satisfaction.       Health maintenance was reviewed with patient today. Patient declined  recommendations.  I explained the risks and benefits of the procedures.    Teena does want her influenza vaccination. Advised to wait 2-3 weeks until feeling better. She will schedule a nurse visit prior to leaving for this vaccine.     Orders Placed This Encounter    COVID/Flu/RSV panel    amoxicillin-clavulanate (AUGMENTIN) 875-125 MG per tablet    fluticasone (FLONASE) 50 MCG/ACT nasal spray    benzonatate (TESSALON PERLES) 100 MG capsule       Patient verbalizes understanding of plan and all questions were answered.  Return if symptoms worsen or fail to improve.    Selene GRAF  Family Medicine         patient/: Yes

## 2025-01-05 NOTE — OB PROVIDER DELIVERY SUMMARY - NS_TIMERUPTUREDADMITTED_OBGYN_ALL_OB_FT
Maru Ramirez  [facility]  [address], [address]  [place] [address]  Phone: [phone]  Fax: [phone]  Follow Up Time: 2 weeks   2 Hour(s) 29 Minute(s)